# Patient Record
Sex: FEMALE | Race: WHITE
[De-identification: names, ages, dates, MRNs, and addresses within clinical notes are randomized per-mention and may not be internally consistent; named-entity substitution may affect disease eponyms.]

---

## 2017-06-16 ENCOUNTER — HOSPITAL ENCOUNTER (OUTPATIENT)
Dept: HOSPITAL 80 - FCATH | Age: 77
Discharge: HOME | End: 2017-06-16
Attending: INTERNAL MEDICINE
Payer: COMMERCIAL

## 2017-06-16 DIAGNOSIS — I44.2: ICD-10-CM

## 2017-06-16 DIAGNOSIS — Z88.2: ICD-10-CM

## 2017-06-16 DIAGNOSIS — Z45.010: Primary | ICD-10-CM

## 2017-06-16 DIAGNOSIS — Z88.0: ICD-10-CM

## 2017-06-16 LAB
% IMMATURE GRANULYOCYTES: 0.4 % (ref 0–1.1)
ABSOLUTE IMMATURE GRANULOCYTES: 0.02 10^3/UL (ref 0–0.1)
ABSOLUTE NRBC COUNT: 0 10^3/UL (ref 0–0.01)
ADD DIFF?: NO
ADD MORPH?: NO
ADD SCAN?: NO
ANION GAP SERPL CALC-SCNC: 10 MEQ/L (ref 8–16)
ATYPICAL LYMPHOCYTE FLAG: 0 (ref 0–99)
CALCIUM SERPL-MCNC: 9.9 MG/DL (ref 8.5–10.4)
CHLORIDE SERPL-SCNC: 101 MEQ/L (ref 97–110)
CO2 SERPL-SCNC: 25 MEQ/L (ref 22–31)
CREAT SERPL-MCNC: 0.9 MG/DL (ref 0.6–1)
ERYTHROCYTE [DISTWIDTH] IN BLOOD BY AUTOMATED COUNT: 13.2 % (ref 11.5–15.2)
FRAGMENT RBC FLAG: 10 (ref 0–99)
GFR SERPL CREATININE-BSD FRML MDRD: > 60 ML/MIN/{1.73_M2}
GLUCOSE SERPL-MCNC: 105 MG/DL (ref 70–100)
HCT VFR BLD CALC: 37.4 % (ref 38–47)
HGB BLD-MCNC: 12.7 G/DL (ref 12.6–16.3)
INR PPP: 1.01 (ref 0.83–1.16)
LEFT SHIFT FLG: 0 (ref 0–99)
LIPEMIA HEMOLYSIS FLAG: 90 (ref 0–99)
MCH RBC BLDCO QN: 29.5 PG (ref 27.9–34.1)
MCHC RBC AUTO-ENTMCNC: 34 G/DL (ref 32.4–36.7)
MCV RBC AUTO: 87 FL (ref 81.5–99.8)
NRBC-AUTO%: 0 % (ref 0–0.2)
PLATELET # BLD: 309 10^3/UL (ref 150–400)
PLATELET CLUMPS FLAG: 0 (ref 0–99)
PMV BLD AUTO: 8.9 FL (ref 8.7–11.7)
POTASSIUM SERPL-SCNC: 4.3 MEQ/L (ref 3.5–5.2)
PROTHROMBIN TIME: 13.2 SEC (ref 12–15)
RBC # BLD AUTO: 4.3 10^6/UL (ref 4.18–5.33)
SODIUM SERPL-SCNC: 136 MEQ/L (ref 134–144)

## 2017-06-16 PROCEDURE — 93005 ELECTROCARDIOGRAM TRACING: CPT

## 2017-06-16 PROCEDURE — 0JH60XZ INSERTION OF TUNNELED VASCULAR ACCESS DEVICE INTO CHEST SUBCUTANEOUS TISSUE AND FASCIA, OPEN APPROACH: ICD-10-PCS | Performed by: INTERNAL MEDICINE

## 2017-06-16 PROCEDURE — 33228 REMV&REPLC PM GEN DUAL LEAD: CPT

## 2017-06-16 PROCEDURE — C1785 PMKR, DUAL, RATE-RESP: HCPCS

## 2017-06-16 PROCEDURE — A4649 SURGICAL SUPPLIES: HCPCS

## 2017-06-16 PROCEDURE — 0JPT0PZ REMOVAL OF CARDIAC RHYTHM RELATED DEVICE FROM TRUNK SUBCUTANEOUS TISSUE AND FASCIA, OPEN APPROACH: ICD-10-PCS | Performed by: INTERNAL MEDICINE

## 2017-06-16 PROCEDURE — 92953 TEMPORARY EXTERNAL PACING: CPT

## 2017-06-16 NOTE — CPIP
[f rep st]



                                                       INVASIVE CARDIAC PROCEDURE





DATE OF PROCEDURE:  06/16/2017



PROCEDURE:  Pulse generator change.  



The explanted device is a [employer] [place] XL [id number], (serial #[id number]).  Date of implant was
 12/23/2009.  The new device is a [employer], model #BM8002 (serial #[id number]).



COMPLICATIONS:  None.



INDICATION FOR THE PROCEDURE:  Initially was complete heart block.  Indication today is for device a
t end-of-life and needs to be replaced.  Patient is pacemaker dependent.



PROCEDURE IN DETAIL:  After informed consent was obtained and n.p.o. status was confirmed, the patie
nt was taken to the cardiac catheterization laboratory in the fasting state.  The region of the left
 subclavicular fossa was cleaned, prepped, and draped in a sterile fashion.  Approximately 20 cc of 
1% lidocaine was utilized for local anesthesia.  Sharp and blunt dissection was used to expose the o
riginal device.  The device was reprogrammed with a wand to a DOO mode; but, in spite of that, there
 was interference and evidence of complete heart block without ventricular capture.  This resulted i
n a requirement for us to briefly pace the patient externally with the external patches in place.  W
e confirmed the presence of a pulse during that time frame.  The device was immediately removed from
 the pocket.  The set screw was backed off several turns.  The ventricular lead was immediately robi
anthony and placed onto the alligator clips, which had been previously programmed to 5 mV of output, wit
h subsequent return of capture on the ventricular lead.  The new device was brought to the table.  T
he lead was disconnected from the alligator clips and analyzer, and placed directly into the lower p
ole lead housing of the device.  The set screw was firmly applied.  The atrial lead was then disconn
ected from the old device, and the old device was removed from the table. The atrial lead was checke
d and found to have a capture threshold 0.75 V at 0.5 msec, sensing 3.2 mV R waves, with a lead impe
dance of 310 ohms.  We did manipulate the atrial lead with it connected to the analyzer, and we were
 unable to reproduce any atrial lead noise.  The atrial lead was then placed in the upper pole lead 
housing of the new device and the set screw firmly applied.  Device was placed into the pocket after
 it was thoroughly flushed and checked for bleeders.  D Stat was placed in the pocket to reduce ooze
, as well as lidocaine with epinephrine.  Local pressure was held and applied.  The skin was then cl
osed with 3-layered 2-0 and 3-0 Vicryl repair with excellent wound edge apposition and hemostasis.  
The patient tolerated the procedure well without immediate complication, and was returned to the pos
t cath recovery unit in good stable condition.  She should be able to be discharged to home a little
 later today unless complications are noted during her stay here.



FINAL IMPRESSION:  Successful pulse generator change with no evidence of problems with either the at
rial or ventricular lead when connected to the analyzer directly via the alligator clips.





Job #:  [id number]

## 2017-06-16 NOTE — CPEKG
Heart Rate: 59

RR Interval: 1017

P-R Interval: 301

QRSD Interval: 150

QT Interval: 464

QTC Interval: 460

P Axis: 80

QRS Axis: 193

T Wave Axis: 79

EKG Severity - ABNORMAL ECG -

EKG Impression: Atrial sensing ventricular pacing

Electronically Signed By: Gibson [email or web address] 16-Jun-2017 16:48:32

## 2017-07-31 ENCOUNTER — HOSPITAL ENCOUNTER (OUTPATIENT)
Dept: HOSPITAL 80 - BHFA | Age: 77
End: 2017-07-31
Attending: INTERNAL MEDICINE
Payer: COMMERCIAL

## 2017-07-31 DIAGNOSIS — I25.10: ICD-10-CM

## 2017-07-31 DIAGNOSIS — I34.0: Primary | ICD-10-CM

## 2017-08-07 ENCOUNTER — HOSPITAL ENCOUNTER (OUTPATIENT)
Dept: HOSPITAL 80 - FIMAGING | Age: 77
End: 2017-08-07
Attending: INTERNAL MEDICINE
Payer: COMMERCIAL

## 2017-08-07 DIAGNOSIS — Z12.31: Primary | ICD-10-CM

## 2017-08-07 PROCEDURE — G0202 SCR MAMMO BI INCL CAD: HCPCS

## 2018-08-15 ENCOUNTER — HOSPITAL ENCOUNTER (OUTPATIENT)
Age: 78
End: 2018-08-15
Payer: COMMERCIAL

## 2018-08-15 DIAGNOSIS — N60.01: Primary | ICD-10-CM

## 2018-09-24 ENCOUNTER — HOSPITAL ENCOUNTER (OUTPATIENT)
Dept: HOSPITAL 80 - BHFA | Age: 78
End: 2018-09-24
Attending: INTERNAL MEDICINE
Payer: COMMERCIAL

## 2018-09-24 DIAGNOSIS — I34.0: Primary | ICD-10-CM
